# Patient Record
Sex: MALE | Race: WHITE | NOT HISPANIC OR LATINO | URBAN - METROPOLITAN AREA
[De-identification: names, ages, dates, MRNs, and addresses within clinical notes are randomized per-mention and may not be internally consistent; named-entity substitution may affect disease eponyms.]

---

## 2021-03-08 ENCOUNTER — EMERGENCY (EMERGENCY)
Facility: HOSPITAL | Age: 36
LOS: 1 days | Discharge: HOME | End: 2021-03-08
Attending: EMERGENCY MEDICINE
Payer: COMMERCIAL

## 2021-03-08 VITALS
TEMPERATURE: 98 F | OXYGEN SATURATION: 97 % | DIASTOLIC BLOOD PRESSURE: 92 MMHG | RESPIRATION RATE: 16 BRPM | SYSTOLIC BLOOD PRESSURE: 123 MMHG | HEART RATE: 91 BPM

## 2021-03-08 VITALS
WEIGHT: 119.93 LBS | SYSTOLIC BLOOD PRESSURE: 133 MMHG | DIASTOLIC BLOOD PRESSURE: 91 MMHG | TEMPERATURE: 98 F | RESPIRATION RATE: 14 BRPM | HEIGHT: 68 IN | HEART RATE: 93 BPM

## 2021-03-08 PROCEDURE — 73030 X-RAY EXAM OF SHOULDER: CPT

## 2021-03-08 PROCEDURE — 73080 X-RAY EXAM OF ELBOW: CPT | Mod: 26,LT

## 2021-03-08 PROCEDURE — 73060 X-RAY EXAM OF HUMERUS: CPT

## 2021-03-08 PROCEDURE — 99284 EMERGENCY DEPT VISIT MOD MDM: CPT | Mod: 25

## 2021-03-08 PROCEDURE — 99284 EMERGENCY DEPT VISIT MOD MDM: CPT

## 2021-03-08 PROCEDURE — 73060 X-RAY EXAM OF HUMERUS: CPT | Mod: 26,LT

## 2021-03-08 PROCEDURE — 73080 X-RAY EXAM OF ELBOW: CPT

## 2021-03-08 PROCEDURE — 73030 X-RAY EXAM OF SHOULDER: CPT | Mod: 26,LT

## 2021-03-08 RX ORDER — IBUPROFEN 200 MG
400 TABLET ORAL ONCE
Refills: 0 | Status: COMPLETED | OUTPATIENT
Start: 2021-03-08 | End: 2021-03-08

## 2021-03-08 RX ADMIN — Medication 400 MILLIGRAM(S): at 16:15

## 2021-03-08 NOTE — ED PROVIDER NOTE - MUSCULOSKELETAL, MLM
Radial pulses intact b/l. Normal sensation throughout the hand.  strength and finger coordination intact to L hand. No obvious bruising or erythema noted. +pain and stiffness w/ passive and active arm flexion and abduction above shoulder level.

## 2021-03-08 NOTE — ED PROVIDER NOTE - NSFOLLOWUPCLINICS_GEN_ALL_ED_FT
[facility]  Sports Medicine  [address] [place] [address]  Phone: [phone]  Fax:   Follow Up Time:
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
## 2021-03-08 NOTE — ED PROVIDER NOTE - NSFOLLOWUPINSTRUCTIONS_ED_ALL_ED_FT
Follow up with sports medicine clinic if your symptoms don't improve after 5 days.    Apply ice to affected area for up to 20 minutes no more than 3 times per day. Take ibuprofen 400mg every 6-8 hours only as needed for pain. Keep the affected part of the body elevated to help reduce swelling.     Return to the ER if you have new chest pain, shortness of breath, fevers, inability to eat or drink, or worsening of symptoms that brought you to the emergency room today.

## 2021-03-08 NOTE — ED PROVIDER NOTE - PATIENT PORTAL LINK FT
You can access the FollowMyHealth Patient Portal offered by [facility] by registering at the following website: [email or web address]. By joining [facility]’s FollowMyHealth portal, you will also be able to view your health information using other applications (apps) compatible with our system.
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
## 2021-04-08 NOTE — ED ADULT NURSE NOTE - NSSEPSISNEWALTERMENTAL_ED_A_ED
Patient provided list of meds after ov with Dr Blayne Marie.  FYI Amlodipine 10mg  1 tablet daily has been added to patient med list.
No

## 2022-09-15 NOTE — ED ADULT NURSE NOTE - CHPI ED NUR TIMING2
Pt calling needing a refill of Lovenox 100 mg syringes.    Pt states that he restarted coumadin last night and took last dose of Lovenox this AM.    INR currently is 1.1    Refill granted and message sent to coumadin clinic for pt F/U on INR  
sudden onset

## 2023-01-22 NOTE — ED ADULT TRIAGE NOTE - ESI TRIAGE ACUITY LEVEL, MLM
[facility] Hospitalist Progress Note    SUBJECTIVE:    Pt seen up in chair  Feeling better    OBJECTIVE:    Current Facility-Administered Medications   Medication   • lidocaine (XYLOCAINE) 5 % ointment   • lidocaine (XYLOCAINE) 2 % gel   • simethicone (MYLICON) tablet 125 mg   • sulfamethoxazole-trimethoprim (BACTRIM SS) 400-80 MG tablet 1 tablet   • filgrastim-sndz (G-CSF) (ZARXIO) injection 480 mcg   • midodrine (PROAMATINE) tablet 5 mg   • bumetanide (BUMEX) tablet 3 mg   • metOLazone (ZAROXOLYN) tablet 5 mg   • mupirocin (BACTROBAN) 2 % ointment 1 application   • azelastine (ASTELIN) 0.1 % nasal spray 1 spray   • CARBOXYMethylcellulose PF (REFRESH PLUS) 0.5 % ophthalmic solution 1 drop   • [Held by provider] potassium CHLORIDE (KLOR-CON) packet 40 mEq   • dextrose 50 % injection 25 g   • dextrose 50 % injection 12.5 g   • glucagon (GLUCAGEN) injection 1 mg   • dextrose (GLUTOSE) 40 % gel 15 g   • dextrose (GLUTOSE) 40 % gel 30 g   • insulin lispro (ADMELOG,HumaLOG) - Correction Dose   • sodium chloride 0.9 % flush bag 25 mL   • sodium chloride (PF) 0.9 % injection 2 mL   • acetaminophen (TYLENOL) tablet 650 mg   • ondansetron (ZOFRAN) injection 4 mg   • albuterol inhaler 2 puff   • fluticasone furoate (ARNUITY ELLIPTA) 100 MCG/ACT inhaler 1 puff   • cholecalciferol (VITAMIN D) tablet 1,000 Units   • cyanocobalamin (Vitamin B-12) tablet 1,000 mcg   • febuxostat (ULORIC) tablet 40 mg   • ferrous sulfate (65 mg Fe per 325 mg) tablet 325 mg   • levothyroxine (SYNTHROID, LEVOTHROID) tablet 50 mcg   • metoPROLOL succinate (TOPROL-XL) ER tablet 25 mg   • [Held by provider] WARFARIN - PHARMACIST MONITORED Misc   • pantoprazole (PROTONIX) EC tablet 40 mg       I/O's    Intake/Output Summary (Last 24 hours) at 1/22/2023 1111  Last data filed at 1/22/2023 0800  Gross per 24 hour   Intake --   Output 3650 ml   Net -3650 ml       Last Recorded Vitals  Vital Last Value 24 Hour Range   Temperature 97.9 °F (36.6 °C) (01/22/23 0725)  Temp  Min: 97.5 °F (36.4 °C)  Max: 98.2 °F (36.8 °C)   Pulse 61 (01/22/23 0725) Pulse  Min: 53  Max: 95   Respiratory 17 (01/22/23 0725) Resp  Min: 16  Max: 18   Non-Invasive  Blood Pressure 117/56 (01/22/23 0725) BP  Min: 92/59  Max: 125/53   Pulse Oximetry 99 % (01/22/23 0725) SpO2  Min: 92 %  Max: 100 %     Vital Today Admitted   Weight 77.6 kg (171 lb 1.2 oz) (01/22/23 0652) Weight: 92.5 kg (203 lb 14.8 oz) (01/05/23 0948)   Height N/A Height: 5' 9\" (175.3 cm) (01/05/23 0948)   BMI N/A BMI (Calculated): 30.11 (01/05/23 0948)       Physical Exam  Vitals and nursing note reviewed.   Constitutional:       General: He is not in acute distress.  Cardiovascular:      Rate and Rhythm: Normal rate and regular rhythm.      Pulses: Normal pulses.      Heart sounds: Murmur heard.    Systolic murmur is present with a grade of 3/6.  Pulmonary:      Effort: No respiratory distress.      Breath sounds: Normal breath sounds. No wheezing.   Abdominal:      General: Bowel sounds are normal. There is no distension.      Palpations: Abdomen is soft.      Tenderness: There is no abdominal tenderness.   Musculoskeletal:      Right lower leg: Edema present.      Left lower leg: Edema present.      Comments: Chronic venous changes and edema b/l LE   Skin:     Coloration: Skin is pale.   Neurological:      General: No focal deficit present.      Mental Status: He is alert and oriented to person, place, and time.      Cranial Nerves: No cranial nerve deficit.      Motor: No weakness.   Psychiatric:         Mood and Affect: Mood normal.         Behavior: Behavior normal.       Labs     Recent Labs   Lab 01/22/23  0357 01/21/23  0430 01/20/23  0516 01/19/23  0456   WBC 1.9* 1.2* 0.9* 0.6*   HCT 26.9* 27.6* 27.5* 28.6*   HGB 8.7* 8.8* 8.8* 9.3*   PLT 92* 86* 87* 90*   INR 1.6  --  1.7 1.7   SODIUM 128* 124* 128* 126*   POTASSIUM 3.6 4.0 3.7 3.9   CHLORIDE 93* 90* 94* 87*   CO2 30 29 30 32   CALCIUM 8.6 8.8 9.0 9.0   GLUCOSE 103* 119* 51* 89    BUN 21* 37* 28* 50*   CREATININE 1.80* 2.14* 1.70* 2.05*       Imaging  No results found.    ASSESSMENT/PLAN:    Hyponatremia due to excessive water intake in the setting of CHF  -continue HD as planned  - S/p tolvaptan 15 mg x 1 on 1/7, 30 mg on 1/8, 1/9, and 1/10  - Encourage protein intake  - Nephrology following     Acute on chronic bilateral lower extremity edema due to volume overload and acute on chronic diastolic congestive heart failure, ruled out DVT  S/p temp dialysis catheter insertion on 1/17/23  - Continue fluid management via HD  - Diuresis with PO Bumex 3 mg BID and metolazone 5 mg daily  - Nephrology following     Lower extremity weakness with fall and scalp contusion  - PT/OT   - Maintain fall precautions     CKD stage IV  - Continue HD as ordered per Renal; had HD yesterday  - Continue midodrine 5 mg TID  - Monitor renal indices and avoid nephrotoxins  - Nephrology following     Hypokalemia/metabolic alkalosis.  - Hold PO potassium chloride 40 mEq twice daily.   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 No evidence of non-Hodgkin lymphoma or leukemia.  Findings should be correlated with morphological studies for complete assessment of this material.  - Pathology smear with smear review shows no significant morphologic abnormalities, final  - DAVID with IFA reflex - negative  - Continue G-CSF and bactrim ppx per hematology  - Monitor CBC  - Hematology following, ?bone marrow  Bx    Supratherapeutic INR - resolved  - Warfarin on hold, will d/w Heme if can restart    Moderate mitral valve regurgitation and severe tricuspid valve regurgitation - Diuresis as above  Persistent atrial fibrillation - Continue metoprolol succinate, hold warfarin. Monitor on telemetry  SSS s/p pacemaker - Continue metoprolol  Primary hypertension - BP stable. Continue metoprolol, Bumex, and metolazone   Hyperlipidemia - No outpatient statin noted  NAFLD - No acute issues  Hypothyroidism - Continue levothyroxine  Asthma - Continue Arnuity Ellipta and PRN albuterol   GERD - Continue Protonix  Gout - Continue Uloric   Osteoarthritis - Continue PRN analgesics    DVT PPx: SCDs    DISPO: Pending clinical improvement and further specialist input. PT recommends daily therapy with 24 hour assistance.      Expected discharge date: TBD    PCP:  Rey Smith MD        4